# Patient Record
Sex: MALE | Race: BLACK OR AFRICAN AMERICAN | NOT HISPANIC OR LATINO | URBAN - METROPOLITAN AREA
[De-identification: names, ages, dates, MRNs, and addresses within clinical notes are randomized per-mention and may not be internally consistent; named-entity substitution may affect disease eponyms.]

---

## 2017-09-02 ENCOUNTER — EMERGENCY (EMERGENCY)
Facility: HOSPITAL | Age: 27
LOS: 1 days | Discharge: HOME | End: 2017-09-02
Attending: EMERGENCY MEDICINE | Admitting: EMERGENCY MEDICINE
Payer: COMMERCIAL

## 2017-09-02 VITALS
TEMPERATURE: 98 F | RESPIRATION RATE: 18 BRPM | SYSTOLIC BLOOD PRESSURE: 147 MMHG | HEART RATE: 102 BPM | OXYGEN SATURATION: 100 % | DIASTOLIC BLOOD PRESSURE: 88 MMHG

## 2017-09-02 PROCEDURE — 99284 EMERGENCY DEPT VISIT MOD MDM: CPT

## 2017-09-02 RX ORDER — IBUPROFEN 200 MG
600 TABLET ORAL ONCE
Qty: 0 | Refills: 0 | Status: COMPLETED | OUTPATIENT
Start: 2017-09-02 | End: 2017-09-02

## 2017-09-02 RX ADMIN — Medication 600 MILLIGRAM(S): at 12:44
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
## 2017-09-02 NOTE — ED PROVIDER NOTE - PROGRESS NOTE DETAILS
ED Attending Dr. Regan: HR at time of discharge 90; unable to enter into flowsheet because pt discharged in system
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
## 2017-09-02 NOTE — ED PROVIDER NOTE - RESPIRATORY CHEST EXAM
R low rib area where pain is present no TTP at time of exam R low rib area where pain is reported no TTP at time of exam; no step off or crepitus

## 2019-05-15 ENCOUNTER — EMERGENCY (EMERGENCY)
Facility: HOSPITAL | Age: 29
LOS: 1 days | Discharge: HOME | End: 2019-05-15
Attending: EMERGENCY MEDICINE | Admitting: EMERGENCY MEDICINE
Payer: SELF-PAY

## 2019-05-15 VITALS
HEART RATE: 89 BPM | RESPIRATION RATE: 18 BRPM | DIASTOLIC BLOOD PRESSURE: 53 MMHG | SYSTOLIC BLOOD PRESSURE: 117 MMHG | OXYGEN SATURATION: 98 %

## 2019-05-15 VITALS
TEMPERATURE: 98 F | HEART RATE: 93 BPM | SYSTOLIC BLOOD PRESSURE: 90 MMHG | RESPIRATION RATE: 20 BRPM | OXYGEN SATURATION: 97 % | DIASTOLIC BLOOD PRESSURE: 61 MMHG | WEIGHT: 149.91 LBS

## 2019-05-15 DIAGNOSIS — R11.2 NAUSEA WITH VOMITING, UNSPECIFIED: ICD-10-CM

## 2019-05-15 LAB
ALBUMIN SERPL ELPH-MCNC: 4.5 G/DL — SIGNIFICANT CHANGE UP (ref 3.3–5)
ALP SERPL-CCNC: 94 U/L — SIGNIFICANT CHANGE UP (ref 40–120)
ALT FLD-CCNC: 33 U/L DA — SIGNIFICANT CHANGE UP (ref 10–45)
ANION GAP SERPL CALC-SCNC: 10 MMOL/L — SIGNIFICANT CHANGE UP (ref 5–17)
AST SERPL-CCNC: 28 U/L — SIGNIFICANT CHANGE UP (ref 10–40)
BASOPHILS # BLD AUTO: 0.03 K/UL — SIGNIFICANT CHANGE UP (ref 0–0.2)
BASOPHILS NFR BLD AUTO: 0.3 % — SIGNIFICANT CHANGE UP (ref 0–2)
BILIRUB SERPL-MCNC: 0.4 MG/DL — SIGNIFICANT CHANGE UP (ref 0.2–1.2)
BUN SERPL-MCNC: 15 MG/DL — SIGNIFICANT CHANGE UP (ref 7–23)
CALCIUM SERPL-MCNC: 9.8 MG/DL — SIGNIFICANT CHANGE UP (ref 8.4–10.5)
CHLORIDE SERPL-SCNC: 99 MMOL/L — SIGNIFICANT CHANGE UP (ref 96–108)
CO2 SERPL-SCNC: 28 MMOL/L — SIGNIFICANT CHANGE UP (ref 22–31)
CREAT SERPL-MCNC: 0.9 MG/DL — SIGNIFICANT CHANGE UP (ref 0.5–1.3)
EOSINOPHIL # BLD AUTO: 0.04 K/UL — SIGNIFICANT CHANGE UP (ref 0–0.5)
EOSINOPHIL NFR BLD AUTO: 0.5 % — SIGNIFICANT CHANGE UP (ref 0–6)
GLUCOSE SERPL-MCNC: 101 MG/DL — HIGH (ref 70–99)
HCT VFR BLD CALC: 46.6 % — SIGNIFICANT CHANGE UP (ref 39–50)
HGB BLD-MCNC: 14.7 G/DL — SIGNIFICANT CHANGE UP (ref 13–17)
IMM GRANULOCYTES NFR BLD AUTO: 0.2 % — SIGNIFICANT CHANGE UP (ref 0–1.5)
LIDOCAIN IGE QN: 134 U/L — SIGNIFICANT CHANGE UP (ref 73–393)
LYMPHOCYTES # BLD AUTO: 1.83 K/UL — SIGNIFICANT CHANGE UP (ref 1–3.3)
LYMPHOCYTES # BLD AUTO: 21 % — SIGNIFICANT CHANGE UP (ref 13–44)
MCHC RBC-ENTMCNC: 25.9 PG — LOW (ref 27–34)
MCHC RBC-ENTMCNC: 31.5 GM/DL — LOW (ref 32–36)
MCV RBC AUTO: 82.2 FL — SIGNIFICANT CHANGE UP (ref 80–100)
MONOCYTES # BLD AUTO: 0.27 K/UL — SIGNIFICANT CHANGE UP (ref 0–0.9)
MONOCYTES NFR BLD AUTO: 3.1 % — SIGNIFICANT CHANGE UP (ref 2–14)
NEUTROPHILS # BLD AUTO: 6.54 K/UL — SIGNIFICANT CHANGE UP (ref 1.8–7.4)
NEUTROPHILS NFR BLD AUTO: 74.9 % — SIGNIFICANT CHANGE UP (ref 43–77)
NRBC # BLD: 0 /100 WBCS — SIGNIFICANT CHANGE UP (ref 0–0)
PLATELET # BLD AUTO: 255 K/UL — SIGNIFICANT CHANGE UP (ref 150–400)
POTASSIUM SERPL-MCNC: 3.9 MMOL/L — SIGNIFICANT CHANGE UP (ref 3.5–5.3)
POTASSIUM SERPL-SCNC: 3.9 MMOL/L — SIGNIFICANT CHANGE UP (ref 3.5–5.3)
PROT SERPL-MCNC: 9.3 G/DL — HIGH (ref 6–8.3)
RBC # BLD: 5.67 M/UL — SIGNIFICANT CHANGE UP (ref 4.2–5.8)
RBC # FLD: 13.2 % — SIGNIFICANT CHANGE UP (ref 10.3–14.5)
SODIUM SERPL-SCNC: 137 MMOL/L — SIGNIFICANT CHANGE UP (ref 135–145)
WBC # BLD: 8.73 K/UL — SIGNIFICANT CHANGE UP (ref 3.8–10.5)
WBC # FLD AUTO: 8.73 K/UL — SIGNIFICANT CHANGE UP (ref 3.8–10.5)

## 2019-05-15 PROCEDURE — 99284 EMERGENCY DEPT VISIT MOD MDM: CPT | Mod: 25

## 2019-05-15 PROCEDURE — 36415 COLL VENOUS BLD VENIPUNCTURE: CPT

## 2019-05-15 PROCEDURE — 96361 HYDRATE IV INFUSION ADD-ON: CPT

## 2019-05-15 PROCEDURE — 83690 ASSAY OF LIPASE: CPT

## 2019-05-15 PROCEDURE — 80053 COMPREHEN METABOLIC PANEL: CPT

## 2019-05-15 PROCEDURE — 96374 THER/PROPH/DIAG INJ IV PUSH: CPT

## 2019-05-15 PROCEDURE — 85027 COMPLETE CBC AUTOMATED: CPT

## 2019-05-15 PROCEDURE — 99053 MED SERV 10PM-8AM 24 HR FAC: CPT

## 2019-05-15 RX ORDER — ONDANSETRON 8 MG/1
4 TABLET, FILM COATED ORAL ONCE
Refills: 0 | Status: COMPLETED | OUTPATIENT
Start: 2019-05-15 | End: 2019-05-15

## 2019-05-15 RX ORDER — SODIUM CHLORIDE 9 MG/ML
1000 INJECTION INTRAMUSCULAR; INTRAVENOUS; SUBCUTANEOUS ONCE
Refills: 0 | Status: COMPLETED | OUTPATIENT
Start: 2019-05-15 | End: 2019-05-15

## 2019-05-15 RX ADMIN — ONDANSETRON 4 MILLIGRAM(S): 8 TABLET, FILM COATED ORAL at 23:15

## 2019-05-15 RX ADMIN — SODIUM CHLORIDE 1000 MILLILITER(S): 9 INJECTION INTRAMUSCULAR; INTRAVENOUS; SUBCUTANEOUS at 23:55

## 2019-05-15 RX ADMIN — SODIUM CHLORIDE 2000 MILLILITER(S): 9 INJECTION INTRAMUSCULAR; INTRAVENOUS; SUBCUTANEOUS at 23:03

## 2019-05-15 NOTE — ED ADULT TRIAGE NOTE - CHIEF COMPLAINT QUOTE
pt presents to ED with mother. Mother states the pt has been vomiting all day, with associated diarrhea

## 2019-05-15 NOTE — ED ADULT NURSE NOTE - OBJECTIVE STATEMENT
pt came in with complaints of vomiting all day and diarrhea. pt states his last episodes of vomiting was right before coming into ED. pt denies any abdominal pain at this time. Pt denies any chest pain, SOB, blurred vision, headache, dizziness, fever, chills or any other complaint at this time.

## 2019-05-15 NOTE — ED PROVIDER NOTE - OBJECTIVE STATEMENT
Pertinent PMH/PSH/FHx/SHx and Review of Systems contained within:  29 y/o male with no sig pmh BIB mother c/o multiple episodes of vomiting and diarrhea since today morning, No fever, c.o crampy , mod abdominal pain.

## 2019-05-15 NOTE — ED ADULT NURSE NOTE - CAS EDN DISCHARGE INTERVENTIONS
lower abdominal pain for 1 month and back pain for 15 days. Warm IV discontinued, cath removed intact

## 2019-05-15 NOTE — ED ADULT NURSE NOTE - NSIMPLEMENTINTERV_GEN_ALL_ED
Implemented All Universal Safety Interventions:  [place] to call system. Call bell, personal items and telephone within reach. Instruct patient to call for assistance. Room bathroom lighting operational. Non-slip footwear when patient is off stretcher. Physically safe environment: no spills, clutter or unnecessary equipment. Stretcher in lowest position, wheels locked, appropriate side rails in place.

## 2021-03-25 ENCOUNTER — INPATIENT (INPATIENT)
Facility: HOSPITAL | Age: 31
LOS: 2 days | Discharge: HOME | End: 2021-03-28
Attending: ORTHOPAEDIC SURGERY | Admitting: ORTHOPAEDIC SURGERY
Payer: MEDICAID

## 2021-03-25 VITALS
TEMPERATURE: 98 F | DIASTOLIC BLOOD PRESSURE: 78 MMHG | HEART RATE: 100 BPM | RESPIRATION RATE: 16 BRPM | SYSTOLIC BLOOD PRESSURE: 114 MMHG | OXYGEN SATURATION: 100 %

## 2021-03-25 LAB
APTT BLD: 27.7 SEC — SIGNIFICANT CHANGE UP (ref 27–36.3)
HCT VFR BLD CALC: 31.6 % — LOW (ref 39–50)
HGB BLD-MCNC: 10.1 G/DL — LOW (ref 13–17)
IANC: 4.57 K/UL — SIGNIFICANT CHANGE UP (ref 1.5–8.5)
INR BLD: 1.76 RATIO — HIGH (ref 0.88–1.16)
MCHC RBC-ENTMCNC: 26 PG — LOW (ref 27–34)
MCHC RBC-ENTMCNC: 32 GM/DL — SIGNIFICANT CHANGE UP (ref 32–36)
MCV RBC AUTO: 81.2 FL — SIGNIFICANT CHANGE UP (ref 80–100)
PLATELET # BLD AUTO: 209 K/UL — SIGNIFICANT CHANGE UP (ref 150–400)
PROTHROM AB SERPL-ACNC: 19.5 SEC — HIGH (ref 10.6–13.6)
RBC # BLD: 3.89 M/UL — LOW (ref 4.2–5.8)
RBC # FLD: 13.4 % — SIGNIFICANT CHANGE UP (ref 10.3–14.5)
WBC # BLD: 7.32 K/UL — SIGNIFICANT CHANGE UP (ref 3.8–10.5)
WBC # FLD AUTO: 7.32 K/UL — SIGNIFICANT CHANGE UP (ref 3.8–10.5)

## 2021-03-25 PROCEDURE — 99284 EMERGENCY DEPT VISIT MOD MDM: CPT

## 2021-03-25 PROCEDURE — 73552 X-RAY EXAM OF FEMUR 2/>: CPT | Mod: 26,RT

## 2021-03-25 PROCEDURE — 73590 X-RAY EXAM OF LOWER LEG: CPT | Mod: 26,RT

## 2021-03-25 PROCEDURE — 73562 X-RAY EXAM OF KNEE 3: CPT | Mod: 26,RT

## 2021-03-25 PROCEDURE — 73700 CT LOWER EXTREMITY W/O DYE: CPT | Mod: 26,RT

## 2021-03-25 PROCEDURE — 73562 X-RAY EXAM OF KNEE 3: CPT | Mod: 26,RT,77

## 2021-03-25 RX ORDER — DIAZEPAM 5 MG
5 TABLET ORAL ONCE
Refills: 0 | Status: DISCONTINUED | OUTPATIENT
Start: 2021-03-25 | End: 2021-03-25

## 2021-03-25 RX ORDER — HYDROMORPHONE HYDROCHLORIDE 2 MG/ML
0.5 INJECTION INTRAMUSCULAR; INTRAVENOUS; SUBCUTANEOUS ONCE
Refills: 0 | Status: DISCONTINUED | OUTPATIENT
Start: 2021-03-25 | End: 2021-03-25

## 2021-03-25 RX ORDER — KETOROLAC TROMETHAMINE 30 MG/ML
30 SYRINGE (ML) INJECTION ONCE
Refills: 0 | Status: DISCONTINUED | OUTPATIENT
Start: 2021-03-25 | End: 2021-03-25

## 2021-03-25 RX ADMIN — Medication 5 MILLIGRAM(S): at 21:28

## 2021-03-25 RX ADMIN — Medication 30 MILLIGRAM(S): at 19:51

## 2021-03-25 RX ADMIN — HYDROMORPHONE HYDROCHLORIDE 0.5 MILLIGRAM(S): 2 INJECTION INTRAMUSCULAR; INTRAVENOUS; SUBCUTANEOUS at 21:57
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
## 2021-03-25 NOTE — ED PROVIDER NOTE - PROGRESS NOTE DETAILS
JUDE Kenney: "Lateralization the patella. Suspected avulsion fracture at the dorsal patella. Medial subluxation of the distal femur relative to the knee. Soft tissue swelling." Seen by ortho, admission recommended.

## 2021-03-25 NOTE — ED PROVIDER NOTE - PHYSICAL EXAMINATION
Full right lower extremity hard cast in place, not tight, DP/PT pulses 2+, sensation to light touch intact, pt moving toes, cap refill <2secs

## 2021-03-25 NOTE — ED PROVIDER NOTE - OBJECTIVE STATEMENT
30 year old male with no known PMH presents to the ED complaining of pain to right lower extremity for 3 days. Pt states he fell off 3 steps of stairs while in [place], went to the hospital there, had x-rays and was told that he fractured his right knee  and was placed in a hard cast. Pt states the cast is heavy and wants it taken off. Denies weakness, numbness or tingling sensation. Denies any other complaints.

## 2021-03-25 NOTE — ED PROVIDER NOTE - CADM POA PRESS ULCER
Kady Malone   MRN: [id number]    Department:  [facility] Emergency Department   Date of Visit:  10/24/2018           Disclosure     Insurance plans vary and the physician(s) referred by the ER may not be covered by your plan.  Please contact you CARE PHYSICIAN AT ONCE OR RETURN IMMEDIATELY TO THE EMERGENCY DEPARTMENT. If you have been prescribed any medication(s), please fill your prescription right away and begin taking the medication(s) as directed.   If you believe that any of the medications No

## 2021-03-25 NOTE — ED PROVIDER NOTE - CLINICAL SUMMARY MEDICAL DECISION MAKING FREE TEXT BOX
30 year old male with no known PMH presents to the ED complaining of pain to right lower extremity for 3 days. HD stable. On exam, no clinical evidence of compartment syndrome. Impression: Unknown fx location of right lower extremity, in hard cast from [place]. Plan for analgesics and x-rays, reassess.

## 2021-03-25 NOTE — ED ADULT NURSE NOTE - CHIEF COMPLAINT QUOTE
pt arriving from [facility], from [facility] for RLE fracture after fall down stairs 3 days ago. Denies fever, cough, SOB. RLE in cast. +pedal pulse noted. Pt denies PMH

## 2021-03-25 NOTE — ED PROVIDER NOTE - ATTENDING CONTRIBUTION TO CARE
I performed a history and physical exam of the patient and discussed their management with the resident. I reviewed the resident's note and agree with the documented findings and plan of care. I have edited as appropriate. My medical decision making and observations are found above.  NAD, no resp distress I performed a history and physical exam of the patient and discussed their management with the PA. I reviewed the PA's note and agree with the documented findings and plan of care. I have edited as appropriate. My medical decision making and observations are found above.    NAD, no resp distress

## 2021-03-25 NOTE — ED ADULT NURSE REASSESSMENT NOTE - NS ED NURSE REASSESS COMMENT FT1
Ortho at bedside for right patella reduction. Pt medicated prior for pain control. 10cc bright red fluid aspirated from right knee post reduction. Right lower extremity splinted with ace bandage by Ortho MD. Will continue to monitor.

## 2021-03-25 NOTE — ED ADULT NURSE NOTE - NSIMPLEMENTINTERV_GEN_ALL_ED
Implemented All Universal Safety Interventions:  [place] to call system. Call bell, personal items and telephone within reach. Instruct patient to call for assistance. Room bathroom lighting operational. Non-slip footwear when patient is off stretcher. Physically safe environment: no spills, clutter or unnecessary equipment. Stretcher in lowest position, wheels locked, appropriate side rails in place.

## 2021-03-25 NOTE — ED ADULT TRIAGE NOTE - CHIEF COMPLAINT QUOTE
pt arriving from [facility], from [facility] for RLE fracture after fall down stairs 3 days ago. Denies fever, cough, SOB. RLE in cast. +pedal pulse noted. Pt denies PMH

## 2021-03-26 ENCOUNTER — TRANSCRIPTION ENCOUNTER (OUTPATIENT)
Age: 31
End: 2021-03-26

## 2021-03-26 DIAGNOSIS — D64.9 ANEMIA, UNSPECIFIED: ICD-10-CM

## 2021-03-26 DIAGNOSIS — S82.091A OTHER FRACTURE OF RIGHT PATELLA, INITIAL ENCOUNTER FOR CLOSED FRACTURE: ICD-10-CM

## 2021-03-26 PROBLEM — Z78.9 OTHER SPECIFIED HEALTH STATUS: Chronic | Status: ACTIVE | Noted: 2019-05-15

## 2021-03-26 LAB
ALBUMIN SERPL ELPH-MCNC: 3.8 G/DL — SIGNIFICANT CHANGE UP (ref 3.3–5)
ALBUMIN SERPL ELPH-MCNC: 3.9 G/DL — SIGNIFICANT CHANGE UP (ref 3.3–5)
ALP SERPL-CCNC: 59 U/L — SIGNIFICANT CHANGE UP (ref 40–120)
ALP SERPL-CCNC: 60 U/L — SIGNIFICANT CHANGE UP (ref 40–120)
ALT FLD-CCNC: 32 U/L — SIGNIFICANT CHANGE UP (ref 4–41)
ALT FLD-CCNC: 33 U/L — SIGNIFICANT CHANGE UP (ref 4–41)
ANION GAP SERPL CALC-SCNC: 10 MMOL/L — SIGNIFICANT CHANGE UP (ref 7–14)
ANION GAP SERPL CALC-SCNC: 12 MMOL/L — SIGNIFICANT CHANGE UP (ref 7–14)
ANISOCYTOSIS BLD QL: SLIGHT — SIGNIFICANT CHANGE UP
APPEARANCE UR: CLEAR — SIGNIFICANT CHANGE UP
APTT BLD: 30.2 SEC — SIGNIFICANT CHANGE UP (ref 27–36.3)
AST SERPL-CCNC: 65 U/L — HIGH (ref 4–40)
AST SERPL-CCNC: 74 U/L — HIGH (ref 4–40)
BACTERIA # UR AUTO: ABNORMAL
BASOPHILS # BLD AUTO: 0 K/UL — SIGNIFICANT CHANGE UP (ref 0–0.2)
BASOPHILS NFR BLD AUTO: 0 % — SIGNIFICANT CHANGE UP (ref 0–2)
BILIRUB DIRECT SERPL-MCNC: <0.2 MG/DL — SIGNIFICANT CHANGE UP (ref 0–0.2)
BILIRUB DIRECT SERPL-MCNC: <0.2 MG/DL — SIGNIFICANT CHANGE UP (ref 0–0.2)
BILIRUB INDIRECT FLD-MCNC: >0.6 MG/DL — SIGNIFICANT CHANGE UP (ref 0–1)
BILIRUB SERPL-MCNC: 0.7 MG/DL — SIGNIFICANT CHANGE UP (ref 0.2–1.2)
BILIRUB SERPL-MCNC: 0.8 MG/DL — SIGNIFICANT CHANGE UP (ref 0.2–1.2)
BILIRUB UR-MCNC: NEGATIVE — SIGNIFICANT CHANGE UP
BLD GP AB SCN SERPL QL: NEGATIVE — SIGNIFICANT CHANGE UP
BLD GP AB SCN SERPL QL: NEGATIVE — SIGNIFICANT CHANGE UP
BUN SERPL-MCNC: 13 MG/DL — SIGNIFICANT CHANGE UP (ref 7–23)
BUN SERPL-MCNC: 15 MG/DL — SIGNIFICANT CHANGE UP (ref 7–23)
CALCIUM SERPL-MCNC: 9.4 MG/DL — SIGNIFICANT CHANGE UP (ref 8.4–10.5)
CALCIUM SERPL-MCNC: 9.5 MG/DL — SIGNIFICANT CHANGE UP (ref 8.4–10.5)
CHLORIDE SERPL-SCNC: 102 MMOL/L — SIGNIFICANT CHANGE UP (ref 98–107)
CHLORIDE SERPL-SCNC: 103 MMOL/L — SIGNIFICANT CHANGE UP (ref 98–107)
CO2 SERPL-SCNC: 24 MMOL/L — SIGNIFICANT CHANGE UP (ref 22–31)
CO2 SERPL-SCNC: 24 MMOL/L — SIGNIFICANT CHANGE UP (ref 22–31)
COLOR SPEC: YELLOW — SIGNIFICANT CHANGE UP
CREAT SERPL-MCNC: 0.7 MG/DL — SIGNIFICANT CHANGE UP (ref 0.5–1.3)
CREAT SERPL-MCNC: 0.79 MG/DL — SIGNIFICANT CHANGE UP (ref 0.5–1.3)
DIFF PNL FLD: ABNORMAL
EOSINOPHIL # BLD AUTO: 0.32 K/UL — SIGNIFICANT CHANGE UP (ref 0–0.5)
EOSINOPHIL NFR BLD AUTO: 4.4 % — SIGNIFICANT CHANGE UP (ref 0–6)
EPI CELLS # UR: 2 /HPF — SIGNIFICANT CHANGE UP (ref 0–5)
GIANT PLATELETS BLD QL SMEAR: PRESENT — SIGNIFICANT CHANGE UP
GLUCOSE SERPL-MCNC: 76 MG/DL — SIGNIFICANT CHANGE UP (ref 70–99)
GLUCOSE SERPL-MCNC: 98 MG/DL — SIGNIFICANT CHANGE UP (ref 70–99)
GLUCOSE UR QL: NEGATIVE — SIGNIFICANT CHANGE UP
HCT VFR BLD CALC: 34.3 % — LOW (ref 39–50)
HGB BLD-MCNC: 11.1 G/DL — LOW (ref 13–17)
HYALINE CASTS # UR AUTO: 1 /LPF — SIGNIFICANT CHANGE UP (ref 0–7)
INR BLD: 1.47 RATIO — HIGH (ref 0.88–1.16)
KETONES UR-MCNC: ABNORMAL
LEUKOCYTE ESTERASE UR-ACNC: ABNORMAL
LYMPHOCYTES # BLD AUTO: 1.36 K/UL — SIGNIFICANT CHANGE UP (ref 1–3.3)
LYMPHOCYTES # BLD AUTO: 18.6 % — SIGNIFICANT CHANGE UP (ref 13–44)
MCHC RBC-ENTMCNC: 26.7 PG — LOW (ref 27–34)
MCHC RBC-ENTMCNC: 32.4 GM/DL — SIGNIFICANT CHANGE UP (ref 32–36)
MCV RBC AUTO: 82.7 FL — SIGNIFICANT CHANGE UP (ref 80–100)
MICROCYTES BLD QL: SLIGHT — SIGNIFICANT CHANGE UP
MONOCYTES # BLD AUTO: 0.45 K/UL — SIGNIFICANT CHANGE UP (ref 0–0.9)
MONOCYTES NFR BLD AUTO: 6.2 % — SIGNIFICANT CHANGE UP (ref 2–14)
NEUTROPHILS # BLD AUTO: 5.05 K/UL — SIGNIFICANT CHANGE UP (ref 1.8–7.4)
NEUTROPHILS NFR BLD AUTO: 69 % — SIGNIFICANT CHANGE UP (ref 43–77)
NITRITE UR-MCNC: NEGATIVE — SIGNIFICANT CHANGE UP
NRBC # BLD: 0 /100 WBCS — SIGNIFICANT CHANGE UP
NRBC # FLD: 0 K/UL — SIGNIFICANT CHANGE UP
OVALOCYTES BLD QL SMEAR: SLIGHT — SIGNIFICANT CHANGE UP
PH UR: 6 — SIGNIFICANT CHANGE UP (ref 5–8)
PLAT MORPH BLD: NORMAL — SIGNIFICANT CHANGE UP
PLATELET # BLD AUTO: 212 K/UL — SIGNIFICANT CHANGE UP (ref 150–400)
PLATELET COUNT - ESTIMATE: NORMAL — SIGNIFICANT CHANGE UP
POIKILOCYTOSIS BLD QL AUTO: SLIGHT — SIGNIFICANT CHANGE UP
POTASSIUM SERPL-MCNC: 3.8 MMOL/L — SIGNIFICANT CHANGE UP (ref 3.5–5.3)
POTASSIUM SERPL-MCNC: 4 MMOL/L — SIGNIFICANT CHANGE UP (ref 3.5–5.3)
POTASSIUM SERPL-SCNC: 3.8 MMOL/L — SIGNIFICANT CHANGE UP (ref 3.5–5.3)
POTASSIUM SERPL-SCNC: 4 MMOL/L — SIGNIFICANT CHANGE UP (ref 3.5–5.3)
PROT SERPL-MCNC: 7 G/DL — SIGNIFICANT CHANGE UP (ref 6–8.3)
PROT SERPL-MCNC: 7.1 G/DL — SIGNIFICANT CHANGE UP (ref 6–8.3)
PROT UR-MCNC: ABNORMAL
PROTHROM AB SERPL-ACNC: 16.6 SEC — HIGH (ref 10.6–13.6)
RBC # BLD: 4.15 M/UL — LOW (ref 4.2–5.8)
RBC # FLD: 13.4 % — SIGNIFICANT CHANGE UP (ref 10.3–14.5)
RBC BLD AUTO: NORMAL — SIGNIFICANT CHANGE UP
RBC CASTS # UR COMP ASSIST: 16 /HPF — HIGH (ref 0–4)
RH IG SCN BLD-IMP: POSITIVE — SIGNIFICANT CHANGE UP
RH IG SCN BLD-IMP: POSITIVE — SIGNIFICANT CHANGE UP
SARS-COV-2 RNA SPEC QL NAA+PROBE: SIGNIFICANT CHANGE UP
SODIUM SERPL-SCNC: 137 MMOL/L — SIGNIFICANT CHANGE UP (ref 135–145)
SODIUM SERPL-SCNC: 138 MMOL/L — SIGNIFICANT CHANGE UP (ref 135–145)
SP GR SPEC: 1.04 — HIGH (ref 1.01–1.02)
UROBILINOGEN FLD QL: SIGNIFICANT CHANGE UP
VARIANT LYMPHS # BLD: 1.8 % — SIGNIFICANT CHANGE UP (ref 0–6)
WBC # BLD: 6.33 K/UL — SIGNIFICANT CHANGE UP (ref 3.8–10.5)
WBC # FLD AUTO: 6.33 K/UL — SIGNIFICANT CHANGE UP (ref 3.8–10.5)
WBC UR QL: 12 /HPF — HIGH (ref 0–5)

## 2021-03-26 PROCEDURE — 93970 EXTREMITY STUDY: CPT | Mod: 26

## 2021-03-26 PROCEDURE — 71045 X-RAY EXAM CHEST 1 VIEW: CPT | Mod: 26

## 2021-03-26 PROCEDURE — 99223 1ST HOSP IP/OBS HIGH 75: CPT

## 2021-03-26 RX ORDER — SODIUM CHLORIDE 9 MG/ML
1000 INJECTION INTRAMUSCULAR; INTRAVENOUS; SUBCUTANEOUS
Refills: 0 | Status: DISCONTINUED | OUTPATIENT
Start: 2021-03-26 | End: 2021-03-27

## 2021-03-26 RX ORDER — HYDROMORPHONE HYDROCHLORIDE 2 MG/ML
0.5 INJECTION INTRAMUSCULAR; INTRAVENOUS; SUBCUTANEOUS EVERY 6 HOURS
Refills: 0 | Status: DISCONTINUED | OUTPATIENT
Start: 2021-03-26 | End: 2021-03-28

## 2021-03-26 RX ORDER — PANTOPRAZOLE SODIUM 20 MG/1
40 TABLET, DELAYED RELEASE ORAL
Refills: 0 | Status: DISCONTINUED | OUTPATIENT
Start: 2021-03-26 | End: 2021-03-28

## 2021-03-26 RX ORDER — CHLORHEXIDINE GLUCONATE 213 G/1000ML
1 SOLUTION TOPICAL ONCE
Refills: 0 | Status: COMPLETED | OUTPATIENT
Start: 2021-03-27 | End: 2021-03-27

## 2021-03-26 RX ORDER — MAGNESIUM HYDROXIDE 400 MG/1
30 TABLET, CHEWABLE ORAL DAILY
Refills: 0 | Status: DISCONTINUED | OUTPATIENT
Start: 2021-03-26 | End: 2021-03-28

## 2021-03-26 RX ORDER — OXYCODONE HYDROCHLORIDE 5 MG/1
2.5 TABLET ORAL EVERY 4 HOURS
Refills: 0 | Status: DISCONTINUED | OUTPATIENT
Start: 2021-03-26 | End: 2021-03-28

## 2021-03-26 RX ORDER — SENNA PLUS 8.6 MG/1
2 TABLET ORAL AT BEDTIME
Refills: 0 | Status: DISCONTINUED | OUTPATIENT
Start: 2021-03-26 | End: 2021-03-28

## 2021-03-26 RX ORDER — ENOXAPARIN SODIUM 100 MG/ML
40 INJECTION SUBCUTANEOUS ONCE
Refills: 0 | Status: COMPLETED | OUTPATIENT
Start: 2021-03-26 | End: 2021-03-26

## 2021-03-26 RX ORDER — OXYCODONE HYDROCHLORIDE 5 MG/1
5 TABLET ORAL EVERY 4 HOURS
Refills: 0 | Status: DISCONTINUED | OUTPATIENT
Start: 2021-03-26 | End: 2021-03-28

## 2021-03-26 RX ORDER — OXYCODONE HYDROCHLORIDE 5 MG/1
5 TABLET ORAL EVERY 4 HOURS
Refills: 0 | Status: DISCONTINUED | OUTPATIENT
Start: 2021-03-26 | End: 2021-03-26

## 2021-03-26 RX ORDER — ONDANSETRON 8 MG/1
4 TABLET, FILM COATED ORAL EVERY 6 HOURS
Refills: 0 | Status: DISCONTINUED | OUTPATIENT
Start: 2021-03-26 | End: 2021-03-28

## 2021-03-26 RX ORDER — POLYETHYLENE GLYCOL 3350 17 G/17G
17 POWDER, FOR SOLUTION ORAL ONCE
Refills: 0 | Status: DISCONTINUED | OUTPATIENT
Start: 2021-03-26 | End: 2021-03-28

## 2021-03-26 RX ORDER — HYDROMORPHONE HYDROCHLORIDE 2 MG/ML
0.5 INJECTION INTRAMUSCULAR; INTRAVENOUS; SUBCUTANEOUS EVERY 6 HOURS
Refills: 0 | Status: DISCONTINUED | OUTPATIENT
Start: 2021-03-26 | End: 2021-03-26

## 2021-03-26 RX ORDER — OXYCODONE HYDROCHLORIDE 5 MG/1
2.5 TABLET ORAL EVERY 4 HOURS
Refills: 0 | Status: DISCONTINUED | OUTPATIENT
Start: 2021-03-26 | End: 2021-03-26

## 2021-03-26 RX ORDER — ACETAMINOPHEN 500 MG
975 TABLET ORAL EVERY 8 HOURS
Refills: 0 | Status: DISCONTINUED | OUTPATIENT
Start: 2021-03-26 | End: 2021-03-26

## 2021-03-26 RX ORDER — OXYCODONE HYDROCHLORIDE 5 MG/1
10 TABLET ORAL EVERY 4 HOURS
Refills: 0 | Status: DISCONTINUED | OUTPATIENT
Start: 2021-03-26 | End: 2021-03-28

## 2021-03-26 RX ORDER — INFLUENZA VIRUS VACCINE 15; 15; 15; 15 UG/.5ML; UG/.5ML; UG/.5ML; UG/.5ML
0.5 SUSPENSION INTRAMUSCULAR ONCE
Refills: 0 | Status: COMPLETED | OUTPATIENT
Start: 2021-03-26 | End: 2021-03-26

## 2021-03-26 RX ORDER — SODIUM CHLORIDE 9 MG/ML
1000 INJECTION INTRAMUSCULAR; INTRAVENOUS; SUBCUTANEOUS
Refills: 0 | Status: DISCONTINUED | OUTPATIENT
Start: 2021-03-26 | End: 2021-03-26

## 2021-03-26 RX ORDER — POVIDONE-IODINE 5 %
1 AEROSOL (ML) TOPICAL ONCE
Refills: 0 | Status: COMPLETED | OUTPATIENT
Start: 2021-03-27 | End: 2021-03-27

## 2021-03-26 RX ADMIN — SODIUM CHLORIDE 125 MILLILITER(S): 9 INJECTION INTRAMUSCULAR; INTRAVENOUS; SUBCUTANEOUS at 07:13

## 2021-03-26 RX ADMIN — OXYCODONE HYDROCHLORIDE 10 MILLIGRAM(S): 5 TABLET ORAL at 13:02

## 2021-03-26 RX ADMIN — SODIUM CHLORIDE 125 MILLILITER(S): 9 INJECTION INTRAMUSCULAR; INTRAVENOUS; SUBCUTANEOUS at 05:00

## 2021-03-26 RX ADMIN — OXYCODONE HYDROCHLORIDE 10 MILLIGRAM(S): 5 TABLET ORAL at 13:54

## 2021-03-26 RX ADMIN — ENOXAPARIN SODIUM 40 MILLIGRAM(S): 100 INJECTION SUBCUTANEOUS at 13:02

## 2021-03-26 RX ADMIN — SODIUM CHLORIDE 125 MILLILITER(S): 9 INJECTION INTRAMUSCULAR; INTRAVENOUS; SUBCUTANEOUS at 05:49

## 2021-03-26 NOTE — H&P ADULT - ATTENDING COMMENTS
Pt with complex, comminuted distal femur fx involving postero-lateral femoral condyle. Patella subluxation likely due to joint incongruity.  Requires ORIF.  Pt made aware of likely long term effects of this fracture (even with anatomic reduction and fixation) including knee stiffness,  loss of ROM, and inability to return to sports/athletics.  general R/B/A also discussed

## 2021-03-26 NOTE — CONSULT NOTE ADULT - SUBJECTIVE AND OBJECTIVE BOX
HPI:    Pt is a 31 y/o male with no significant pmhx who presents to the ER with RLE pain for 2-3 days. He was in [facility] 2 days ago and fell off 3 steps onto his R side and was unable to stand up nor ambulate. He denies any loss of consciousness or head trauma. He went to a hospital in [facility] and was told he had a patella dislocation and put in a cylinder cast. He landed at [facility] and came to the ED. He was prescribed combination diclofenac/paracetamol which he has been taking TID as well as ibuprofen 400mg which he has been taking bid. He denies any hx of cardiac disease, cva, DM, CKD nor any bleeding disorder. No bruising noted on his body. No other medications or blood thinners recently. At baseline pt is able to ambulate and jog with no chest pain nor shortness of breath. He is able to take care of all his ADLs.    PAST MEDICAL & SURGICAL HISTORY:  No pertinent past medical history    No pertinent past medical history    Pt with hx of L knee surgery        Review of Systems:   CONSTITUTIONAL: No fever, weight loss, or fatigue  EYES: No eye pain, visual disturbances, or discharge  ENMT:  No difficulty hearing, tinnitus, vertigo; No sinus or throat pain  NECK: No pain or stiffness  RESPIRATORY: No cough, wheezing, chills or hemoptysis; No shortness of breath  CARDIOVASCULAR: No chest pain, palpitations, dizziness, or leg swelling  GASTROINTESTINAL: No abdominal or epigastric pain. No nausea, vomiting, or hematemesis; No diarrhea or constipation. No melena or hematochezia.  GENITOURINARY: No dysuria, frequency, hematuria, or incontinence  NEUROLOGICAL: +RLE weakness due to pain, otherwise no headaches, memory loss, numbness, or tremors  SKIN: No itching, burning, rashes, or lesions   MUSCULOSKELETAL: +RLE pain  PSYCHIATRIC: No depression, anxiety, mood swings, or difficulty sleeping  HEME/LYMPH: No easy bruising, or bleeding gums      Allergies    No Known Allergies    Intolerances        Social History: no smoking, alcohol nor drug use    FAMILY HISTORY: no family hx of bleeding disorders nor clots      MEDICATIONS  (STANDING):  acetaminophen   Tablet .. 975 milliGRAM(s) Oral every 8 hours  senna 2 Tablet(s) Oral at bedtime  sodium chloride 0.9%. 1000 milliLiter(s) (125 mL/Hr) IV Continuous <Continuous>    MEDICATIONS  (PRN):  HYDROmorphone  Injectable 0.5 milliGRAM(s) IV Push every 6 hours PRN Severe Pain (7 - 10)  magnesium hydroxide Suspension 30 milliLiter(s) Oral daily PRN Constipation  oxyCODONE    IR 2.5 milliGRAM(s) Oral every 4 hours PRN Moderate Pain (4 - 6)  oxyCODONE    IR 5 milliGRAM(s) Oral every 4 hours PRN Severe Pain (7 - 10)        CAPILLARY BLOOD GLUCOSE        I&O's Summary      PHYSICAL EXAM:  GENERAL: NAD, well-developed  HEAD:  Atraumatic, Normocephalic  EYES: EOMI, PERRLA, conjunctiva and sclera clear  NECK: Supple, No JVD  CHEST/LUNG: Clear to auscultation bilaterally; No wheeze  HEART: Regular rate and rhythm; No murmurs, rubs, or gallops  ABDOMEN: Soft, Nontender, Nondistended; Bowel sounds present  EXTREMITIES: RLE wrapped in ACE wrap, no pitting edema b/l  PSYCH: AAOx3  NEUROLOGY: non-focal, RLE ROM limited due to pain  SKIN: No rashes or lesions    LABS:                        10.1   7.32  )-----------( 209      ( 25 Mar 2021 23:37 )             31.6     03-25    138  |  102  |  13  ----------------------------<  98  3.8   |  24  |  0.70    Ca    9.4      25 Mar 2021 23:37    TPro  7.1  /  Alb  3.8  /  TBili  0.7  /  DBili  x   /  AST  74<H>  /  ALT  32  /  AlkPhos  60  03-25    PT/INR - ( 25 Mar 2021 23:37 )   PT: 19.5 sec;   INR: 1.76 ratio         PTT - ( 25 Mar 2021 23:37 )  PTT:27.7 sec    EKG: Normal sinus rhythm, early repol lead V2    xray fibula: IMPRESSION:    Status post casting of the right lower extremity limiting evaluation.  Lateralization the patella. Suspected avulsion fracture at the dorsal patella.  Medial subluxation of the distal femur relative to the knee. Soft tissue swelling.  CT can be performed for more detailed evaluation.      RADIOLOGY & ADDITIONAL TESTS:    Imaging Personally Reviewed:    Consultant(s) Notes Reviewed:      Care Discussed with Consultants/Other Providers:   HPI:    Pt is a 29 y/o male with no significant pmhx who presents to the ER with RLE pain for 2-3 days. He was in [facility] 2 days ago and fell off 3 steps onto his R side and was unable to stand up nor ambulate. He denies any loss of consciousness or head trauma. He went to a hospital in [facility] and was told he had a patella dislocation and put in a cylinder cast. He landed at [facility] and came to the ED. He was prescribed combination diclofenac/paracetamol which he has been taking TID as well as ibuprofen 400mg which he has been taking bid. He denies any hx of cardiac disease, cva, DM, CKD nor any bleeding disorder. No bruising noted on his body. No other medications or blood thinners recently. No other fever, chills, cough, chest pain, shortness of breath, abd pain, LE edema, diarrhea. At baseline pt is able to ambulate and jog with no chest pain nor shortness of breath. He is able to take care of all his ADLs.    PAST MEDICAL & SURGICAL HISTORY:  No pertinent past medical history    No pertinent past medical history    Pt with hx of L knee surgery        Review of Systems:   CONSTITUTIONAL: No fever, weight loss, or fatigue  EYES: No eye pain, visual disturbances, or discharge  ENMT:  No difficulty hearing, tinnitus, vertigo; No sinus or throat pain  NECK: No pain or stiffness  RESPIRATORY: No cough, wheezing, chills or hemoptysis; No shortness of breath  CARDIOVASCULAR: No chest pain, palpitations, dizziness, or leg swelling  GASTROINTESTINAL: No abdominal or epigastric pain. No nausea, vomiting, or hematemesis; No diarrhea or constipation. No melena or hematochezia.  GENITOURINARY: No dysuria, frequency, hematuria, or incontinence  NEUROLOGICAL: +RLE weakness due to pain, otherwise no headaches, memory loss, numbness, or tremors  SKIN: No itching, burning, rashes, or lesions   MUSCULOSKELETAL: +RLE pain  PSYCHIATRIC: No depression, anxiety, mood swings, or difficulty sleeping  HEME/LYMPH: No easy bruising, or bleeding gums      Allergies    No Known Allergies    Intolerances        Social History: no smoking, alcohol nor drug use    FAMILY HISTORY: no family hx of bleeding disorders nor clots      MEDICATIONS  (STANDING):  acetaminophen   Tablet .. 975 milliGRAM(s) Oral every 8 hours  senna 2 Tablet(s) Oral at bedtime  sodium chloride 0.9%. 1000 milliLiter(s) (125 mL/Hr) IV Continuous <Continuous>    MEDICATIONS  (PRN):  HYDROmorphone  Injectable 0.5 milliGRAM(s) IV Push every 6 hours PRN Severe Pain (7 - 10)  magnesium hydroxide Suspension 30 milliLiter(s) Oral daily PRN Constipation  oxyCODONE    IR 2.5 milliGRAM(s) Oral every 4 hours PRN Moderate Pain (4 - 6)  oxyCODONE    IR 5 milliGRAM(s) Oral every 4 hours PRN Severe Pain (7 - 10)        CAPILLARY BLOOD GLUCOSE        I&O's Summary      PHYSICAL EXAM:  GENERAL: NAD, well-developed  HEAD:  Atraumatic, Normocephalic  EYES: EOMI, PERRLA, conjunctiva and sclera clear  NECK: Supple, No JVD  CHEST/LUNG: Clear to auscultation bilaterally; No wheeze  HEART: Regular rate and rhythm; No murmurs, rubs, or gallops  ABDOMEN: Soft, Nontender, Nondistended; Bowel sounds present  EXTREMITIES: RLE wrapped in ACE wrap, no pitting edema b/l  PSYCH: AAOx3  NEUROLOGY: non-focal, RLE ROM limited due to pain  SKIN: No rashes or lesions    LABS:                        10.1   7.32  )-----------( 209      ( 25 Mar 2021 23:37 )             31.6     03-25    138  |  102  |  13  ----------------------------<  98  3.8   |  24  |  0.70    Ca    9.4      25 Mar 2021 23:37    TPro  7.1  /  Alb  3.8  /  TBili  0.7  /  DBili  x   /  AST  74<H>  /  ALT  32  /  AlkPhos  60  03-25    PT/INR - ( 25 Mar 2021 23:37 )   PT: 19.5 sec;   INR: 1.76 ratio         PTT - ( 25 Mar 2021 23:37 )  PTT:27.7 sec    EKG: Normal sinus rhythm, early repol lead V2    xray fibula: IMPRESSION:    Status post casting of the right lower extremity limiting evaluation.  Lateralization the patella. Suspected avulsion fracture at the dorsal patella.  Medial subluxation of the distal femur relative to the knee. Soft tissue swelling.  CT can be performed for more detailed evaluation.      RADIOLOGY & ADDITIONAL TESTS:    Imaging Personally Reviewed:    Consultant(s) Notes Reviewed:      Care Discussed with Consultants/Other Providers:

## 2021-03-26 NOTE — CONSULT NOTE ADULT - ASSESSMENT
29 y/o male with no significant pmhx who presents to the ER with RLE pain for 2-3 days after a mechanical fall, found in the ER to have R patella lateral dislocation and distal femoral condyle fx
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
## 2021-03-26 NOTE — CONSULT NOTE ADULT - PROBLEM SELECTOR RECOMMENDATION 2
Pt with unspecified anemia to 10 from baseline 14.7 two years ago. Pt denies any bleeding or bruising. Pt also with elevated INR to 1.76 with normal PTT, normal plt, normal albumin/LFTs. No hx of easy bleeding/bruising and no blood clots  -no recent antibiotics or blood thinners, just taking meds prescribed by [facility] combo diclofenac/paracetamol and ibuprofen  -Doubt DIC as plt, ptt normal and not hemolyzing as bili wnl. would repeat INR and obtain iron studies including tibc, ferritin, retic count, transferrin, b12, folate, fibrinogen  -defer to ortho regarding INR level prior to surgery. would consult heme in AM if INR still elevated or worsens. Pt with unspecified anemia to 10 from baseline 14.7 two years ago. Pt denies any bleeding or bruising. Pt also with elevated INR to 1.76 with normal PTT, normal plt, normal albumin/LFTs. No hx of easy bleeding/bruising and no blood clots  -no recent antibiotics or blood thinners, just taking meds prescribed by [facility] combo diclofenac/paracetamol and ibuprofen  -Doubt DIC as plt, ptt normal and not hemolyzing as bili wnl. would repeat labs including cbc, lfts, INR and obtain iron studies including tibc, ferritin, retic count, transferrin, b12, folate, fibrinogen, ldh, haptoglobin and hold off surgery if labs worsening  -defer to ortho regarding INR level prior to surgery. would consult heme in AM if INR still elevated or worsens. Pt with unspecified anemia to 10 from baseline 14.7 two years ago. Pt denies any bleeding or bruising. Pt also with elevated INR to 1.76 with normal PTT, normal plt, normal albumin/LFTs. No hx of easy bleeding/bruising and no blood clots  -no recent antibiotics or blood thinners, just taking meds prescribed by [facility] combo diclofenac/paracetamol and ibuprofen  -Doubt DIC as plt, ptt normal and not hemolyzing as bili wnl. would repeat labs including cbc, lfts, INR and obtain iron studies including tibc, ferritin, retic count, transferrin, b12, folate, fibrinogen, ldh, haptoglobin   -defer to ortho regarding INR level prior to surgery. would consult heme in AM if INR/labs still elevated or worsens. Pt with unspecified anemia to 10 from baseline 14.7 two years ago. Pt denies any bleeding or bruising. Pt also with elevated INR to 1.76 with normal PTT, normal plt, normal albumin/bili. No hx of easy bleeding/bruising and no blood clots  -no recent antibiotics or blood thinners, just taking meds prescribed by [facility] combo diclofenac/paracetamol and ibuprofen  -Doubt DIC as plt, ptt normal and not hemolyzing as bili wnl. would repeat labs including cbc, lfts, INR and obtain iron studies including tibc, ferritin, retic count, transferrin, b12, folate, fibrinogen, ldh, haptoglobin   -defer to ortho regarding INR level prior to surgery. would consult heme in AM if INR/labs still elevated or worsens.  -Pt denies any alcohol use, however AST slightly elevated and with elevated INR would stop tylenol for now. Repeat LFTs as above

## 2021-03-26 NOTE — H&P ADULT - NSHPRISKHIVSCREEN_GEN_ALL_CORE
Peripheral Block    Performed by: Tristan Hutchison MD  Authorized by: Tristan Hutchison MD       Block Type:     Assessment:    Injection Assessment:           Interscalene Nerve Block    right Interscalene nerve block:     Risks, benefits, alternatives explained at length and patient agrees to proceed. Time out performed and site for block/surgery identified. Standard monitors applied, 3 L NC O2, and sedation given as recorded by RN so as to achieve patient comfort and anxiolysis, but maintain meaningful verbal contact. Sterile prep followed by 1-2 mL local at insertion site. A 40 mm, 22G insulated stimiplex needle was inserted with ultrasound guidance. 15 ml 0.5% ropivacaine + 15 ml 2% lidocaine with 1:200k epi was injected without resistance and with gentle aspiration every 3-5 ml.      5 ml 0.5% ropivacaine + 5 ml lidocaine with 1:200k epi was injected at right axilla for field block. No pain, paresthesias, or blood were noted. No complications. VSS throughout. Per the request of surgeon for post-op pain. Unable to store ultrasound image due to machine error. Unable to offer due to clinical condition

## 2021-03-26 NOTE — CONSULT NOTE ADULT - PROBLEM SELECTOR RECOMMENDATION 9
2/2 mechanical fall. Admitted to ortho service for surgical intervention  -Pt was able to perform all ADLs prior to fall. RCRI of 0, class 1 risk  -EKG normal sinus rhythm, vitals stable. No further cardiac workup prior to surgery  -pain control per orthopaedics 2/2 mechanical fall. Admitted to ortho service for surgical intervention  -Pt was able to perform all ADLs prior to fall. RCRI of 0, class 1 risk  -EKG normal sinus rhythm, vitals stable. No further cardiac workup prior to surgery  -pain control per orthopaedics  -Would repeat UA as results unclear - concentrated with ketones, small leuks only 12 wbc, repeat after fluids. f/u cultures. Pt afebrile, asymptomatic, with no leukocytosis

## 2021-03-27 ENCOUNTER — RESULT REVIEW (OUTPATIENT)
Age: 31
End: 2021-03-27

## 2021-03-27 LAB
ANION GAP SERPL CALC-SCNC: 11 MMOL/L — SIGNIFICANT CHANGE UP (ref 7–14)
ANION GAP SERPL CALC-SCNC: 7 MMOL/L — SIGNIFICANT CHANGE UP (ref 7–14)
APTT BLD: 29.3 SEC — SIGNIFICANT CHANGE UP (ref 27–36.3)
BUN SERPL-MCNC: 11 MG/DL — SIGNIFICANT CHANGE UP (ref 7–23)
BUN SERPL-MCNC: 14 MG/DL — SIGNIFICANT CHANGE UP (ref 7–23)
CALCIUM SERPL-MCNC: 8.8 MG/DL — SIGNIFICANT CHANGE UP (ref 8.4–10.5)
CALCIUM SERPL-MCNC: 8.9 MG/DL — SIGNIFICANT CHANGE UP (ref 8.4–10.5)
CHLORIDE SERPL-SCNC: 103 MMOL/L — SIGNIFICANT CHANGE UP (ref 98–107)
CHLORIDE SERPL-SCNC: 105 MMOL/L — SIGNIFICANT CHANGE UP (ref 98–107)
CO2 SERPL-SCNC: 23 MMOL/L — SIGNIFICANT CHANGE UP (ref 22–31)
CO2 SERPL-SCNC: 26 MMOL/L — SIGNIFICANT CHANGE UP (ref 22–31)
CREAT SERPL-MCNC: 0.64 MG/DL — SIGNIFICANT CHANGE UP (ref 0.5–1.3)
CREAT SERPL-MCNC: 0.67 MG/DL — SIGNIFICANT CHANGE UP (ref 0.5–1.3)
GLUCOSE SERPL-MCNC: 133 MG/DL — HIGH (ref 70–99)
GLUCOSE SERPL-MCNC: 88 MG/DL — SIGNIFICANT CHANGE UP (ref 70–99)
HCT VFR BLD CALC: 31 % — LOW (ref 39–50)
HCT VFR BLD CALC: 32.3 % — LOW (ref 39–50)
HGB BLD-MCNC: 10 G/DL — LOW (ref 13–17)
HGB BLD-MCNC: 10.1 G/DL — LOW (ref 13–17)
INR BLD: 1.38 RATIO — HIGH (ref 0.88–1.16)
MCHC RBC-ENTMCNC: 26 PG — LOW (ref 27–34)
MCHC RBC-ENTMCNC: 26.6 PG — LOW (ref 27–34)
MCHC RBC-ENTMCNC: 31.3 GM/DL — LOW (ref 32–36)
MCHC RBC-ENTMCNC: 32.3 GM/DL — SIGNIFICANT CHANGE UP (ref 32–36)
MCV RBC AUTO: 82.4 FL — SIGNIFICANT CHANGE UP (ref 80–100)
MCV RBC AUTO: 83 FL — SIGNIFICANT CHANGE UP (ref 80–100)
NRBC # BLD: 0 /100 WBCS — SIGNIFICANT CHANGE UP
NRBC # BLD: 0 /100 WBCS — SIGNIFICANT CHANGE UP
NRBC # FLD: 0 K/UL — SIGNIFICANT CHANGE UP
NRBC # FLD: 0 K/UL — SIGNIFICANT CHANGE UP
PLATELET # BLD AUTO: 217 K/UL — SIGNIFICANT CHANGE UP (ref 150–400)
PLATELET # BLD AUTO: 230 K/UL — SIGNIFICANT CHANGE UP (ref 150–400)
POTASSIUM SERPL-MCNC: 4 MMOL/L — SIGNIFICANT CHANGE UP (ref 3.5–5.3)
POTASSIUM SERPL-MCNC: 4.2 MMOL/L — SIGNIFICANT CHANGE UP (ref 3.5–5.3)
POTASSIUM SERPL-SCNC: 4 MMOL/L — SIGNIFICANT CHANGE UP (ref 3.5–5.3)
POTASSIUM SERPL-SCNC: 4.2 MMOL/L — SIGNIFICANT CHANGE UP (ref 3.5–5.3)
PROTHROM AB SERPL-ACNC: 15.6 SEC — HIGH (ref 10.6–13.6)
RBC # BLD: 3.76 M/UL — LOW (ref 4.2–5.8)
RBC # BLD: 3.89 M/UL — LOW (ref 4.2–5.8)
RBC # FLD: 13.2 % — SIGNIFICANT CHANGE UP (ref 10.3–14.5)
RBC # FLD: 13.4 % — SIGNIFICANT CHANGE UP (ref 10.3–14.5)
SODIUM SERPL-SCNC: 137 MMOL/L — SIGNIFICANT CHANGE UP (ref 135–145)
SODIUM SERPL-SCNC: 138 MMOL/L — SIGNIFICANT CHANGE UP (ref 135–145)
WBC # BLD: 10.77 K/UL — HIGH (ref 3.8–10.5)
WBC # BLD: 5.48 K/UL — SIGNIFICANT CHANGE UP (ref 3.8–10.5)
WBC # FLD AUTO: 10.77 K/UL — HIGH (ref 3.8–10.5)
WBC # FLD AUTO: 5.48 K/UL — SIGNIFICANT CHANGE UP (ref 3.8–10.5)

## 2021-03-27 PROCEDURE — 88311 DECALCIFY TISSUE: CPT | Mod: 26

## 2021-03-27 PROCEDURE — 88304 TISSUE EXAM BY PATHOLOGIST: CPT | Mod: 26

## 2021-03-27 PROCEDURE — 99232 SBSQ HOSP IP/OBS MODERATE 35: CPT

## 2021-03-27 PROCEDURE — 27513 TREATMENT OF THIGH FRACTURE: CPT | Mod: RT

## 2021-03-27 RX ORDER — OXYCODONE HYDROCHLORIDE 5 MG/1
5 TABLET ORAL ONCE
Refills: 0 | Status: DISCONTINUED | OUTPATIENT
Start: 2021-03-27 | End: 2021-03-27

## 2021-03-27 RX ORDER — FENTANYL CITRATE 50 UG/ML
50 INJECTION INTRAVENOUS
Refills: 0 | Status: DISCONTINUED | OUTPATIENT
Start: 2021-03-27 | End: 2021-03-27

## 2021-03-27 RX ORDER — ENOXAPARIN SODIUM 100 MG/ML
40 INJECTION SUBCUTANEOUS DAILY
Refills: 0 | Status: DISCONTINUED | OUTPATIENT
Start: 2021-03-28 | End: 2021-03-28

## 2021-03-27 RX ORDER — HYDROMORPHONE HYDROCHLORIDE 2 MG/ML
0.5 INJECTION INTRAMUSCULAR; INTRAVENOUS; SUBCUTANEOUS
Refills: 0 | Status: DISCONTINUED | OUTPATIENT
Start: 2021-03-27 | End: 2021-03-27

## 2021-03-27 RX ORDER — KETOROLAC TROMETHAMINE 30 MG/ML
30 SYRINGE (ML) INJECTION EVERY 6 HOURS
Refills: 0 | Status: DISCONTINUED | OUTPATIENT
Start: 2021-03-27 | End: 2021-03-28

## 2021-03-27 RX ORDER — TRAMADOL HYDROCHLORIDE 50 MG/1
50 TABLET ORAL EVERY 6 HOURS
Refills: 0 | Status: DISCONTINUED | OUTPATIENT
Start: 2021-03-27 | End: 2021-03-28

## 2021-03-27 RX ADMIN — SODIUM CHLORIDE 125 MILLILITER(S): 9 INJECTION INTRAMUSCULAR; INTRAVENOUS; SUBCUTANEOUS at 00:09

## 2021-03-27 RX ADMIN — Medication 30 MILLIGRAM(S): at 23:49

## 2021-03-27 RX ADMIN — OXYCODONE HYDROCHLORIDE 10 MILLIGRAM(S): 5 TABLET ORAL at 22:33

## 2021-03-27 RX ADMIN — SENNA PLUS 2 TABLET(S): 8.6 TABLET ORAL at 21:33

## 2021-03-27 RX ADMIN — Medication 1 APPLICATION(S): at 04:58

## 2021-03-27 RX ADMIN — CHLORHEXIDINE GLUCONATE 1 APPLICATION(S): 213 SOLUTION TOPICAL at 04:58

## 2021-03-27 RX ADMIN — OXYCODONE HYDROCHLORIDE 10 MILLIGRAM(S): 5 TABLET ORAL at 14:11

## 2021-03-27 RX ADMIN — OXYCODONE HYDROCHLORIDE 10 MILLIGRAM(S): 5 TABLET ORAL at 21:33

## 2021-03-27 RX ADMIN — OXYCODONE HYDROCHLORIDE 5 MILLIGRAM(S): 5 TABLET ORAL at 03:35

## 2021-03-27 RX ADMIN — TRAMADOL HYDROCHLORIDE 50 MILLIGRAM(S): 50 TABLET ORAL at 23:49

## 2021-03-27 RX ADMIN — Medication 30 MILLIGRAM(S): at 17:16

## 2021-03-27 RX ADMIN — OXYCODONE HYDROCHLORIDE 5 MILLIGRAM(S): 5 TABLET ORAL at 04:20

## 2021-03-27 RX ADMIN — TRAMADOL HYDROCHLORIDE 50 MILLIGRAM(S): 50 TABLET ORAL at 17:16

## 2021-03-27 RX ADMIN — OXYCODONE HYDROCHLORIDE 10 MILLIGRAM(S): 5 TABLET ORAL at 13:41

## 2021-03-27 NOTE — PROGRESS NOTE ADULT - ASSESSMENT
29 y/o male with no significant pmhx who presents to the ER with RLE pain for 2-3 days after a mechanical fall, found in the ER to have R patella lateral dislocation and distal femoral condyle fx. s/p ORIF of left distal femur on 3/27 31 y/o male with no significant pmhx who presents to the ER with RLE pain for 2-3 days after a mechanical fall, found in the ER to have R patella lateral dislocation and distal femoral condyle fx. s/p ORIF of right distal femur on 3/27

## 2021-03-27 NOTE — PROGRESS NOTE ADULT - PROBLEM SELECTOR PLAN 1
2/2 mechanical fall. Admitted to ortho service for surgical intervention  -Pt was able to perform all ADLs prior to fall. RCRI of 0, class 1 risk  -EKG normal sinus rhythm, vitals stable. No further cardiac workup prior to surgery  -s/p ORIF of left distal femur fracture on 3/27  -postop management per ortho, pain control, IVF, IS, DVT ppx, PT/OT 2/2 mechanical fall. Admitted to ortho service for surgical intervention  -Pt was able to perform all ADLs prior to fall. RCRI of 0, class 1 risk  -EKG normal sinus rhythm, vitals stable. No further cardiac workup prior to surgery  -leg doppler (3/26) neg for DVT  -s/p ORIF of right distal femur fracture on 3/27  -postop management per ortho, pain control, IVF, IS, DVT ppx, PT/OT  -check vit D level in AM

## 2021-03-27 NOTE — BRIEF OPERATIVE NOTE - NSICDXBRIEFPROCEDURE_GEN_ALL_CORE_FT
PROCEDURES:  Open reduction and internal fixation (ORIF) of fracture of left distal femur 27-Mar-2021 14:18:01  Taylor Mazariegos

## 2021-03-27 NOTE — PROGRESS NOTE ADULT - SUBJECTIVE AND OBJECTIVE BOX
Ortho Progress Note    S: Patient seen and examined. No acute events overnight. Pain well controlled with current regimen. Denies lightheadedness/dizziness, CP/SOB. Tolerating diet. NPO for OR today.      O:  Physical Exam:  Gen: Laying in bed, NAD, alert and oriented.   Resp: Unlabored breathing  RLE : EHL/FHL/TA/Sol intact          + SILT DP/SP/MCCRACKEN/Sa/Tib          +DP, extremity WWP    Vital Signs Last 24 Hrs  T(C): 36.7 (27 Mar 2021 04:48), Max: 36.9 (26 Mar 2021 06:32)  T(F): 98.1 (27 Mar 2021 04:48), Max: 98.5 (26 Mar 2021 13:53)  HR: 88 (27 Mar 2021 04:48) (85 - 100)  BP: 105/62 (27 Mar 2021 04:48) (105/62 - 131/74)  BP(mean): --  RR: 18 (27 Mar 2021 04:48) (16 - 18)  SpO2: 100% (27 Mar 2021 04:48) (100% - 100%)                          11.1   6.33  )-----------( 212      ( 26 Mar 2021 07:16 )             34.3                         10.1   7.32  )-----------( 209      ( 25 Mar 2021 23:37 )             31.6       03-26    137  |  103  |  15  ----------------------------<  76  4.0   |  24  |  0.79        PT/INR - ( 26 Mar 2021 07:16 )   PT: 16.6 sec;   INR: 1.47 ratio         PTT - ( 26 Mar 2021 07:16 )  PTT:30.2 sec      A/P: 30M with R distal femur fracture, going to OR today  - Pain control  - Hold chemical DVT ppx  - NPO/IVF  - Besrest  - KI  - Dispo" OR today

## 2021-03-27 NOTE — PROGRESS NOTE ADULT - SUBJECTIVE AND OBJECTIVE BOX
ORTHO ATTENDING POST OP    s/p ORIF R distal femur  Reyes dressing  knee immobilizer  NWB R  LE  elevation  Lovenox  -> DC on  BID  ANcef 1g x 24h  venodynes  CBC in RR and AM  OOB to chair  PT consult  f/u 2 weeks

## 2021-03-27 NOTE — PROGRESS NOTE ADULT - PROBLEM SELECTOR PLAN 2
Pt with unspecified anemia to 10 from baseline 14.7 two years ago. Pt denies any bleeding or bruising. Pt also with elevated INR to 1.76 with normal PTT, normal plt, normal albumin/bili. No hx of easy bleeding/bruising and no blood clots  -Trend CBC, transfuse prn  -check iron panel, b12/folate in AM Pt with unspecified anemia to 10 from baseline 14.7 two years ago. Pt denies any bleeding or bruising. Pt also with elevated INR to 1.76 with normal PTT, normal plt, normal albumin/bili. No hx of easy bleeding/bruising and no blood clots  -Trend CBC, transfuse prn  -check iron panel, b12/folate, TSH in AM

## 2021-03-27 NOTE — PROGRESS NOTE ADULT - SUBJECTIVE AND OBJECTIVE BOX
Dr. Miri Rob  Pager [phone]    PROGRESS NOTE:     Patient is a 30y old  Male who presents with a chief complaint of R Distal Femur Fx (27 Mar 2021 12:44)      SUBJECTIVE / OVERNIGHT EVENTS: pt denies chest pain or sob, seen postop in PACU, hemodynamically stable, pain controlled  ADDITIONAL REVIEW OF SYSTEMS: afebrile    MEDICATIONS  (STANDING):  influenza   Vaccine 0.5 milliLiter(s) IntraMuscular once  ketorolac   Injectable 30 milliGRAM(s) IV Push every 6 hours  pantoprazole    Tablet 40 milliGRAM(s) Oral before breakfast  polyethylene glycol 3350 17 Gram(s) Oral once  senna 2 Tablet(s) Oral at bedtime  traMADol 50 milliGRAM(s) Oral every 6 hours    MEDICATIONS  (PRN):  fentaNYL    Injectable 50 MICROGram(s) IV Push every 15 minutes PRN Severe Pain (7 - 10)  HYDROmorphone  Injectable 0.5 milliGRAM(s) IV Push every 10 minutes PRN Moderate Pain (4 - 6)  HYDROmorphone  Injectable 0.5 milliGRAM(s) IV Push every 6 hours PRN breakthrough pain  magnesium hydroxide Suspension 30 milliLiter(s) Oral daily PRN Constipation  ondansetron Injectable 4 milliGRAM(s) IV Push every 6 hours PRN Nausea and/or Vomiting  oxyCODONE    IR 5 milliGRAM(s) Oral every 4 hours PRN Moderate Pain (4 - 6)  oxyCODONE    IR 2.5 milliGRAM(s) Oral every 4 hours PRN Mild Pain (1 - 3)  oxyCODONE    IR 5 milliGRAM(s) Oral once PRN Moderate Pain (4 - 6)  oxyCODONE    IR 10 milliGRAM(s) Oral every 4 hours PRN Severe Pain (7 - 10)      CAPILLARY BLOOD GLUCOSE        I&O's Summary      PHYSICAL EXAM:  Vital Signs Last 24 Hrs  T(C): 36.4 (27 Mar 2021 14:00), Max: 36.8 (27 Mar 2021 09:02)  T(F): 97.5 (27 Mar 2021 14:00), Max: 98.3 (27 Mar 2021 09:02)  HR: 96 (27 Mar 2021 14:00) (85 - 112)  BP: 136/82 (27 Mar 2021 14:00) (105/62 - 136/82)  BP(mean): 94 (27 Mar 2021 14:00) (70 - 94)  RR: 20 (27 Mar 2021 14:00) (16 - 20)  SpO2: 98% (27 Mar 2021 14:00) (94% - 100%)  General: NAD, well developed  HEAD:  Atraumatic, Normocephalic  EYES: EOMI,  conjunctiva and sclera clear  NECK: Supple, No JVD  CHEST/LUNG: Clear to auscultation bilaterally; No wheeze  HEART: Regular rate and rhythm, tachycardic,; No murmurs, rubs, or gallops  ABDOMEN: Soft, Nontender, Nondistended; Bowel sounds present  EXTREMITIES: RLE in wound dressing  PSYCH: AAOx3  NEUROLOGY: non-focal, RLE ROM limited due to pain  SKIN: No rashes or lesions    LABS:                        10.1   x     )-----------( 230      ( 27 Mar 2021 14:17 )             32.3     03-27    138  |  105  |  14  ----------------------------<  88  4.0   |  26  |  0.64    Ca    8.8      27 Mar 2021 05:31    TPro  7.0  /  Alb  3.9  /  TBili  0.8  /  DBili  <0.2  /  AST  65<H>  /  ALT  33  /  AlkPhos  59  03-26    PT/INR - ( 27 Mar 2021 05:31 )   PT: 15.6 sec;   INR: 1.38 ratio         PTT - ( 27 Mar 2021 05:31 )  PTT:29.3 sec      Urinalysis Basic - ( 26 Mar 2021 02:47 )    Color: Yellow / Appearance: Clear / S.039 / pH: x  Gluc: x / Ketone: Small  / Bili: Negative / Urobili: <2 mg/dL   Blood: x / Protein: 100 mg/dL / Nitrite: Negative   Leuk Esterase: Small / RBC: 16 /HPF / WBC 12 /HPF   Sq Epi: x / Non Sq Epi: 2 /HPF / Bacteria: Occasional      RADIOLOGY & ADDITIONAL TESTS:  Results Reviewed:   Imaging Personally Reviewed:  < from: US Duplex Venous Lower Ext Complete, Bilateral (21 @ 08:51) >    IMPRESSION:  No evidence of deep venous thrombosis in either lower extremity.      Electrocardiogram Personally Reviewed:    COORDINATION OF CARE:  Care Discussed with Consultants/Other Providers [Y/N]:  Prior or Outpatient Records Reviewed [Y/N]:

## 2021-03-27 NOTE — PROGRESS NOTE ADULT - SUBJECTIVE AND OBJECTIVE BOX
Orthopaedic Surgery Postop Check    Subjective:   Patient seen and examined  No acute events overnight  Pain well controlled in post op period    Objective:        PE    NAD  LLE:   dressing C/D/I  motor intact GS/TA/EHL  SILT S/S/SP/DP  WWP                30y Male s/p L distal femur ORIF  - Pain control  - NWB LLE in BJKI  - PT/OT/OOB  - DVT ppx  - IS  - Dispo planning   Orthopaedic Surgery Postop Check    Subjective:   Patient seen and examined  No acute events overnight  Pain well controlled in post op period    Objective:        PE    NAD  LLE:   dressing C/D/I  motor intact GS/TA/EHL  SILT S/S/SP/DP  WWP    Vitals 24hrs  Vital Signs Last 24 Hrs  T(C): 36.8 (27 Mar 2021 21:34), Max: 36.8 (27 Mar 2021 09:02)  T(F): 98.3 (27 Mar 2021 21:34), Max: 98.3 (27 Mar 2021 09:02)  HR: 100 (27 Mar 2021 21:34) (83 - 112)  BP: 120/71 (27 Mar 2021 21:34) (105/62 - 137/78)  BP(mean): 94 (27 Mar 2021 14:00) (70 - 94)  RR: 18 (27 Mar 2021 21:34) (16 - 20)  SpO2: 100% (27 Mar 2021 21:34) (94% - 100%)      03-27-21 @ 07:01  -  03-28-21 @ 03:36  --------------------------------------------------------  IN: 0 mL / OUT: 1500 mL / NET: -1500 mL        Lab Results 24hrs:                        10.1   10.77 )-----------( 230      ( 27 Mar 2021 14:17 )             32.3     03-27    137  |  103  |  11  ----------------------------<  133<H>  4.2   |  23  |  0.67    Ca    8.9      27 Mar 2021 14:17    TPro  7.0  /  Alb  3.9  /  TBili  0.8  /  DBili  <0.2  /  AST  65<H>  /  ALT  3d3  /  AlkPhos  59  03-26                    30y Male s/p L distal femur ORIF  - Pain control  - NWB LLE in BJKI  - PT/OT/OOB  - DVT ppx  - IS  - Dispo planning

## 2021-03-28 ENCOUNTER — TRANSCRIPTION ENCOUNTER (OUTPATIENT)
Age: 31
End: 2021-03-28

## 2021-03-28 VITALS
OXYGEN SATURATION: 98 % | SYSTOLIC BLOOD PRESSURE: 108 MMHG | TEMPERATURE: 98 F | RESPIRATION RATE: 18 BRPM | HEART RATE: 104 BPM | DIASTOLIC BLOOD PRESSURE: 61 MMHG

## 2021-03-28 LAB
24R-OH-CALCIDIOL SERPL-MCNC: 11.8 NG/ML — LOW (ref 30–80)
ANION GAP SERPL CALC-SCNC: 10 MMOL/L — SIGNIFICANT CHANGE UP (ref 7–14)
BUN SERPL-MCNC: 11 MG/DL — SIGNIFICANT CHANGE UP (ref 7–23)
CALCIUM SERPL-MCNC: 8.8 MG/DL — SIGNIFICANT CHANGE UP (ref 8.4–10.5)
CHLORIDE SERPL-SCNC: 101 MMOL/L — SIGNIFICANT CHANGE UP (ref 98–107)
CO2 SERPL-SCNC: 25 MMOL/L — SIGNIFICANT CHANGE UP (ref 22–31)
CREAT SERPL-MCNC: 0.7 MG/DL — SIGNIFICANT CHANGE UP (ref 0.5–1.3)
FERRITIN SERPL-MCNC: 139 NG/ML — SIGNIFICANT CHANGE UP (ref 30–400)
FOLATE SERPL-MCNC: 7.3 NG/ML — SIGNIFICANT CHANGE UP (ref 3.1–17.5)
GLUCOSE SERPL-MCNC: 90 MG/DL — SIGNIFICANT CHANGE UP (ref 70–99)
HCT VFR BLD CALC: 27.7 % — LOW (ref 39–50)
HGB BLD-MCNC: 8.9 G/DL — LOW (ref 13–17)
IRON SATN MFR SERPL: 10 % — LOW (ref 14–50)
IRON SATN MFR SERPL: 21 UG/DL — LOW (ref 45–165)
MCHC RBC-ENTMCNC: 26.3 PG — LOW (ref 27–34)
MCHC RBC-ENTMCNC: 32.1 GM/DL — SIGNIFICANT CHANGE UP (ref 32–36)
MCV RBC AUTO: 82 FL — SIGNIFICANT CHANGE UP (ref 80–100)
NRBC # BLD: 0 /100 WBCS — SIGNIFICANT CHANGE UP
NRBC # FLD: 0 K/UL — SIGNIFICANT CHANGE UP
PLATELET # BLD AUTO: 209 K/UL — SIGNIFICANT CHANGE UP (ref 150–400)
POTASSIUM SERPL-MCNC: 4 MMOL/L — SIGNIFICANT CHANGE UP (ref 3.5–5.3)
POTASSIUM SERPL-SCNC: 4 MMOL/L — SIGNIFICANT CHANGE UP (ref 3.5–5.3)
RBC # BLD: 3.38 M/UL — LOW (ref 4.2–5.8)
RBC # FLD: 13.4 % — SIGNIFICANT CHANGE UP (ref 10.3–14.5)
SODIUM SERPL-SCNC: 136 MMOL/L — SIGNIFICANT CHANGE UP (ref 135–145)
TIBC SERPL-MCNC: 219 UG/DL — LOW (ref 220–430)
TSH SERPL-MCNC: 0.49 UIU/ML — SIGNIFICANT CHANGE UP (ref 0.27–4.2)
UIBC SERPL-MCNC: 198 UG/DL — SIGNIFICANT CHANGE UP (ref 110–370)
VIT B12 SERPL-MCNC: 341 PG/ML — SIGNIFICANT CHANGE UP (ref 200–900)
WBC # BLD: 8.17 K/UL — SIGNIFICANT CHANGE UP (ref 3.8–10.5)
WBC # FLD AUTO: 8.17 K/UL — SIGNIFICANT CHANGE UP (ref 3.8–10.5)

## 2021-03-28 PROCEDURE — 99232 SBSQ HOSP IP/OBS MODERATE 35: CPT

## 2021-03-28 RX ORDER — OXYCODONE HYDROCHLORIDE 5 MG/1
1 TABLET ORAL
Qty: 20 | Refills: 0
Start: 2021-03-28 | End: 2021-04-01

## 2021-03-28 RX ORDER — ASPIRIN/CALCIUM CARB/MAGNESIUM 324 MG
1 TABLET ORAL
Qty: 60 | Refills: 0
Start: 2021-03-28 | End: 2021-04-26

## 2021-03-28 RX ADMIN — Medication 30 MILLIGRAM(S): at 13:33

## 2021-03-28 RX ADMIN — Medication 30 MILLIGRAM(S): at 05:59

## 2021-03-28 RX ADMIN — OXYCODONE HYDROCHLORIDE 10 MILLIGRAM(S): 5 TABLET ORAL at 05:58

## 2021-03-28 RX ADMIN — OXYCODONE HYDROCHLORIDE 10 MILLIGRAM(S): 5 TABLET ORAL at 14:20

## 2021-03-28 RX ADMIN — ENOXAPARIN SODIUM 40 MILLIGRAM(S): 100 INJECTION SUBCUTANEOUS at 13:33

## 2021-03-28 RX ADMIN — OXYCODONE HYDROCHLORIDE 10 MILLIGRAM(S): 5 TABLET ORAL at 13:33

## 2021-03-28 RX ADMIN — OXYCODONE HYDROCHLORIDE 10 MILLIGRAM(S): 5 TABLET ORAL at 06:58

## 2021-03-28 RX ADMIN — PANTOPRAZOLE SODIUM 40 MILLIGRAM(S): 20 TABLET, DELAYED RELEASE ORAL at 05:59

## 2021-03-28 NOTE — DISCHARGE NOTE PROVIDER - NSDCCPCAREPLAN_GEN_ALL_CORE_FT
PRINCIPAL DISCHARGE DIAGNOSIS  Diagnosis: Other closed fracture of right patella, initial encounter  Assessment and Plan of Treatment:

## 2021-03-28 NOTE — PROGRESS NOTE ADULT - ASSESSMENT
31 y/o male with no significant pmhx who presents to the ER with RLE pain for 2-3 days after a mechanical fall, found in the ER to have R patella lateral dislocation and distal femoral condyle fx. s/p ORIF of right distal femur on 3/27

## 2021-03-28 NOTE — PROGRESS NOTE ADULT - PROBLEM SELECTOR PLAN 2
Pt with unspecified anemia to 10 from baseline 14.7 two years ago. Pt denies any bleeding or bruising. Pt also with elevated INR to 1.76 with normal PTT, normal plt, normal albumin/bili. No hx of easy bleeding/bruising and no blood clots  -INR down to 1.38  -Trend CBC, transfuse prn, hgb downtrending from 10.1 to 8.9, asymptomatic  -ferritin 139, TSAT 10%, folate 7.3, b12 341, TSH 0.49

## 2021-03-28 NOTE — PROGRESS NOTE ADULT - SUBJECTIVE AND OBJECTIVE BOX
Dr. Miri Rob  Pager [phone]    PROGRESS NOTE:     Patient is a 30y old  Male who presents with a chief complaint of R Distal Femur Fx (28 Mar 2021 08:39)      SUBJECTIVE / OVERNIGHT EVENTS: pt feels well, pain controlled  ADDITIONAL REVIEW OF SYSTEMS: no chest pain/sob, afebrile     MEDICATIONS  (STANDING):  enoxaparin Injectable 40 milliGRAM(s) SubCutaneous daily  influenza   Vaccine 0.5 milliLiter(s) IntraMuscular once  ketorolac   Injectable 30 milliGRAM(s) IV Push every 6 hours  pantoprazole    Tablet 40 milliGRAM(s) Oral before breakfast  polyethylene glycol 3350 17 Gram(s) Oral once  senna 2 Tablet(s) Oral at bedtime  traMADol 50 milliGRAM(s) Oral every 6 hours    MEDICATIONS  (PRN):  bisacodyl Suppository 10 milliGRAM(s) Rectal daily PRN If no bowel movement by POD#2  HYDROmorphone  Injectable 0.5 milliGRAM(s) IV Push every 6 hours PRN breakthrough pain  magnesium hydroxide Suspension 30 milliLiter(s) Oral daily PRN Constipation  ondansetron Injectable 4 milliGRAM(s) IV Push every 6 hours PRN Nausea and/or Vomiting  oxyCODONE    IR 5 milliGRAM(s) Oral every 4 hours PRN Moderate Pain (4 - 6)  oxyCODONE    IR 2.5 milliGRAM(s) Oral every 4 hours PRN Mild Pain (1 - 3)  oxyCODONE    IR 10 milliGRAM(s) Oral every 4 hours PRN Severe Pain (7 - 10)      CAPILLARY BLOOD GLUCOSE        I&O's Summary    27 Mar 2021 07:01  -  28 Mar 2021 07:00  --------------------------------------------------------  IN: 0 mL / OUT: 2200 mL / NET: -2200 mL        PHYSICAL EXAM:  Vital Signs Last 24 Hrs  T(C): 36.8 (28 Mar 2021 08:58), Max: 36.8 (27 Mar 2021 17:21)  T(F): 98.3 (28 Mar 2021 08:58), Max: 98.3 (27 Mar 2021 17:21)  HR: 100 (28 Mar 2021 08:58) (83 - 112)  BP: 128/71 (28 Mar 2021 08:58) (117/64 - 137/78)  BP(mean): 94 (27 Mar 2021 14:00) (70 - 94)  RR: 18 (28 Mar 2021 08:58) (17 - 20)  SpO2: 100% (28 Mar 2021 08:58) (94% - 100%)  General: NAD, well developed  HEAD:  Atraumatic, Normocephalic  EYES: EOMI,  conjunctiva and sclera clear  NECK: Supple, No JVD  CHEST/LUNG: Clear to auscultation bilaterally; No wheeze  HEART: Regular rate and rhythm, tachycardic,; No murmurs, rubs, or gallops  ABDOMEN: Soft, Nontender, Nondistended; Bowel sounds present  EXTREMITIES: RLE in wound dressing  PSYCH: AAOx3  NEUROLOGY: non-focal, RLE ROM limited due to pain  SKIN: No rashes or lesions    LABS:                        8.9    8.17  )-----------( 209      ( 28 Mar 2021 09:31 )             27.7     03-28    136  |  101  |  11  ----------------------------<  90  4.0   |  25  |  0.70    Ca    8.8      28 Mar 2021 09:31      PT/INR - ( 27 Mar 2021 05:31 )   PT: 15.6 sec;   INR: 1.38 ratio         PTT - ( 27 Mar 2021 05:31 )  PTT:29.3 sec    RADIOLOGY & ADDITIONAL TESTS:  Results Reviewed:   Imaging Personally Reviewed:    Electrocardiogram Personally Reviewed:    COORDINATION OF CARE:  Care Discussed with Consultants/Other Providers [Y/N]:  Prior or Outpatient Records Reviewed [Y/N]:

## 2021-03-28 NOTE — PROGRESS NOTE ADULT - SUBJECTIVE AND OBJECTIVE BOX
Orthopaedic Surgery Progress Note     Subjective:   Patient seen and examined  No acute events overnight  Pain well controlled    Objective:        PE  Vitals 24hrs  Vital Signs Last 24 Hrs  T(C): 36.8 (27 Mar 2021 21:34), Max: 36.8 (27 Mar 2021 09:02)  T(F): 98.3 (27 Mar 2021 21:34), Max: 98.3 (27 Mar 2021 09:02)  HR: 100 (27 Mar 2021 21:34) (83 - 112)  BP: 120/71 (27 Mar 2021 21:34) (105/62 - 137/78)  BP(mean): 94 (27 Mar 2021 14:00) (70 - 94)  RR: 18 (27 Mar 2021 21:34) (16 - 20)  SpO2: 100% (27 Mar 2021 21:34) (94% - 100%)      03-27-21 @ 07:01  -  03-28-21 @ 03:35  --------------------------------------------------------  IN: 0 mL / OUT: 1500 mL / NET: -1500 mL        Lab Results 24hrs:                        10.1   10.77 )-----------( 230      ( 27 Mar 2021 14:17 )             32.3     03-27    137  |  103  |  11  ----------------------------<  133<H>  4.2   |  23  |  0.67    Ca    8.9      27 Mar 2021 14:17    TPro  7.0  /  Alb  3.9  /  TBili  0.8  /  DBili  <0.2  /  AST  65<H>  /  ALT  33  /  AlkPhos  59  03-26            NAD  LLE:   dressing C/D/I  motor intact GS/TA/EHL  SILT S/S/SP/DP  WWP                30y Male s/p L distal femur ORIF  - Pain control  - NWB LLE in BJKI  - PT/OT/OOB  - DVT ppx  - IS  - Dispo planning: Home

## 2021-03-28 NOTE — DISCHARGE NOTE NURSING/CASE MANAGEMENT/SOCIAL WORK - NSDCPNINST_GEN_ALL_CORE
Call Dr. Alberts for a follow-up post-op appointment. Notify your doctor for temperature greater than 100.4, for redness/swelling/drainage from your surgical incision, or for pain not relieved by pain medication. Utilize teresa brace as instructed. Take over the counter stool softeners, which is a side effect of pain medication.

## 2021-03-28 NOTE — DISCHARGE NOTE PROVIDER - CARE PROVIDER_API CALL
Tim Alberts (MD)  Orthopaedic Surgery  [address]  [place] [address]  Phone: [phone]  Fax: [phone]  Follow Up Time: 1 week

## 2021-03-28 NOTE — DISCHARGE NOTE PROVIDER - NSDCMRMEDTOKEN_GEN_ALL_CORE_FT
Ecotrin 325 mg oral delayed release tablet: 1 tab(s) orally 2 times a day MDD:2  oxyCODONE 5 mg oral tablet: 1 tab(s) orally every 6 hours, As Needed -Moderate Pain (4 - 6) MDD:4

## 2021-03-28 NOTE — DISCHARGE NOTE PROVIDER - HOSPITAL COURSE
30yMale c/o R knee pain s/p mechanical fall 3 days ago.  Patient was in [facility] when it was raining and he slipped in the mud/fell down stairs.  Patient denies head hit or LOC.  Was unable to standup / ambulate after fall. Denies anything hitting knee/leg when he fell.  Was seen in the ED there and placed in a cylinder cast. Was told he had a patella dislocation and would need surgery.  Landed at [facility] and came straight to [facility] ED. Patient denies numbness or tingling in the RLE. Patient denies any other injuries. Patient underwent ORIF R femur and tolerated the procedure well. He was discharged to the PACU in stable condition and told to be NWB on his RLE in a knee immobilizer. He received a [place] knee brace locked in extension. He cleared PT on POD1, tolerated PO and voided, and can be discharged to home in stable condition. 30yMale c/o R knee pain s/p mechanical fall 3 days ago.  Patient was in [facility] when it was raining and he slipped in the mud/fell down stairs.  Patient denies head hit or LOC.  Was unable to standup / ambulate after fall. Denies anything hitting knee/leg when he fell.  Was seen in the ED there and placed in a cylinder cast. Was told he had a patella dislocation and would need surgery.  Landed at [facility] and came straight to [facility] ED. Patient denies numbness or tingling in the RLE. Patient denies any other injuries. Preoperatively, patient received bilateral lower extremity doppler studies to rule out DVT, and both legs were negative for DVTs. Patient underwent ORIF R femur on 3/27 and tolerated the procedure well. He was discharged to the PACU in stable condition and told to be NWB on his RLE in a knee immobilizer. He received a [place] knee brace locked in extension. He cleared PT on POD1, tolerated PO and voided, and can be discharged to home in stable condition.

## 2021-03-28 NOTE — PROGRESS NOTE ADULT - PROBLEM SELECTOR PLAN 1
2/2 mechanical fall. Admitted to ortho service for surgical intervention  -leg doppler (3/26) neg for DVT  -s/p ORIF of right distal femur fracture on 3/27  -postop management per ortho, pain control, IVF, IS, DVT ppx, PT/OT  -check vit D level   -dispo planning home today per primary team

## 2021-03-28 NOTE — PROGRESS NOTE ADULT - REASON FOR ADMISSION
R Distal Femur Fx

## 2021-03-28 NOTE — PROGRESS NOTE ADULT - PROBLEM SELECTOR PROBLEM 1
Other closed fracture of right patella, initial encounter
Other closed fracture of right patella, initial encounter

## 2021-03-28 NOTE — DISCHARGE NOTE NURSING/CASE MANAGEMENT/SOCIAL WORK - PATIENT PORTAL LINK FT
You can access the FollowMyHealth Patient Portal offered by [facility] by registering at the following website: [email or web address]. By joining [employer]’s FollowMyHealth portal, you will also be able to view your health information using other applications (apps) compatible with our system.

## 2021-03-28 NOTE — DISCHARGE NOTE PROVIDER - NSDCCPTREATMENT_GEN_ALL_CORE_FT
PRINCIPAL PROCEDURE  Procedure: Open reduction and internal fixation (ORIF) of fracture of left distal femur  Findings and Treatment:       SECONDARY PROCEDURE  Procedure: Open reduction and internal fixation (ORIF) of fracture of left distal femur  Findings and Treatment:

## 2021-03-28 NOTE — DISCHARGE NOTE PROVIDER - NSDCFUADDINST_GEN_ALL_CORE_FT
RANDEE STODDARD in [place] locked in extension.    Pain medication sent to pharmacy.    Please take Aspirin twice per day for DVT prophylaxis.

## 2021-03-29 NOTE — ED POST DISCHARGE NOTE - RESULT SUMMARY
JUDE Martin: [place] discrepancy: R knee Status post casting of the right lower extremity limiting evaluation.  Lateralization the patella. Suspected avulsion fracture at the dorsal patella. Medial subluxation of the distal femur relative to the knee. Soft tissue swelling. CT was performed in the ED and patient was admitted to orthopedics for operative intervention.

## 2021-04-01 PROBLEM — Z00.00 ENCOUNTER FOR PREVENTIVE HEALTH EXAMINATION: Status: ACTIVE | Noted: 2021-04-01

## 2021-04-01 LAB — SURGICAL PATHOLOGY STUDY: SIGNIFICANT CHANGE UP

## 2021-04-03 ENCOUNTER — EMERGENCY (EMERGENCY)
Facility: HOSPITAL | Age: 31
LOS: 0 days | Discharge: HOME | End: 2021-04-03
Attending: EMERGENCY MEDICINE
Payer: MEDICAID

## 2021-04-03 VITALS
RESPIRATION RATE: 18 BRPM | HEART RATE: 89 BPM | SYSTOLIC BLOOD PRESSURE: 107 MMHG | OXYGEN SATURATION: 99 % | DIASTOLIC BLOOD PRESSURE: 70 MMHG | TEMPERATURE: 98 F

## 2021-04-03 VITALS
WEIGHT: 119.93 LBS | HEART RATE: 97 BPM | HEIGHT: 70 IN | RESPIRATION RATE: 16 BRPM | DIASTOLIC BLOOD PRESSURE: 83 MMHG | TEMPERATURE: 99 F | SYSTOLIC BLOOD PRESSURE: 128 MMHG | OXYGEN SATURATION: 98 %

## 2021-04-03 DIAGNOSIS — G89.18 OTHER ACUTE POSTPROCEDURAL PAIN: ICD-10-CM

## 2021-04-03 DIAGNOSIS — Z98.890 OTHER SPECIFIED POSTPROCEDURAL STATES: ICD-10-CM

## 2021-04-03 DIAGNOSIS — M25.561 PAIN IN RIGHT KNEE: ICD-10-CM

## 2021-04-03 DIAGNOSIS — Z79.82 LONG TERM (CURRENT) USE OF ASPIRIN: ICD-10-CM

## 2021-04-03 PROCEDURE — 73562 X-RAY EXAM OF KNEE 3: CPT | Mod: 26,RT

## 2021-04-03 PROCEDURE — 73552 X-RAY EXAM OF FEMUR 2/>: CPT | Mod: 26,RT

## 2021-04-03 PROCEDURE — 99284 EMERGENCY DEPT VISIT MOD MDM: CPT

## 2021-04-03 RX ORDER — OXYCODONE AND ACETAMINOPHEN 5; 325 MG/1; MG/1
1 TABLET ORAL ONCE
Refills: 0 | Status: DISCONTINUED | OUTPATIENT
Start: 2021-04-03 | End: 2021-04-03

## 2021-04-03 RX ORDER — OXYCODONE HYDROCHLORIDE 5 MG/1
1 TABLET ORAL
Qty: 20 | Refills: 0
Start: 2021-04-03 | End: 2021-04-07

## 2021-04-03 RX ADMIN — OXYCODONE AND ACETAMINOPHEN 1 TABLET(S): 5; 325 TABLET ORAL at 06:34

## 2021-04-03 RX ADMIN — OXYCODONE AND ACETAMINOPHEN 1 TABLET(S): 5; 325 TABLET ORAL at 04:34

## 2021-04-03 RX ADMIN — OXYCODONE AND ACETAMINOPHEN 1 TABLET(S): 5; 325 TABLET ORAL at 05:57

## 2021-04-03 RX ADMIN — OXYCODONE AND ACETAMINOPHEN 1 TABLET(S): 5; 325 TABLET ORAL at 05:39

## 2021-04-03 NOTE — ED PROVIDER NOTE - PATIENT PORTAL LINK FT
You can access the FollowMyHealth Patient Portal offered by [facility] by registering at the following website: [email or web address]. By joining [facility] FollowMyHealth portal, you will also be able to view your health information using other applications (apps) compatible with our system.

## 2021-04-03 NOTE — ED PROVIDER NOTE - NSDCPRINTRESULTS_ED_ALL_ED
Patient requests all Lab and Radiology Results on their Discharge Instructions Dressing: pressure dressing with telfa

## 2021-04-03 NOTE — ED PROVIDER NOTE - CLINICAL SUMMARY MEDICAL DECISION MAKING FREE TEXT BOX
d/w ortho resident Marek, who reviewed xray, states no removal of brace, recommend few more days percocet and fu with ortho.

## 2021-04-03 NOTE — ED PROVIDER NOTE - OBJECTIVE STATEMENT
29yo male with pmh persistent rt knee pain, currently POD #6 ORIF for distal femur fx. Currently in [place] knee brace locked in extension. Pt also ran out of percocet. denies further trauma    No fever/chills, No photophobia/eye pain/changes in vision, No ear pain/sore throat/dysphagia, No chest pain/palpitations, no SOB/cough, no wheeze/stridor, No abdominal pain, No N/V/D, no dysuria/frequency/discharge, No neck/back pain, + Rt knee pain. no rash, no changes in neurological status/function.

## 2021-04-03 NOTE — ED PROVIDER NOTE - NSFOLLOWUPINSTRUCTIONS_ED_ALL_ED_FT
Please follow up with your orthopedist per your appt.  Return to the Emergency Department for worsening severe knee pain, numbness or weakness or your leg or any other concern.  Rest, drink plenty of fluids.  Advance activity as tolerated.  Continue all previously prescribed medications as directed.

## 2021-04-03 NOTE — ED PROVIDER NOTE - PHYSICAL EXAMINATION
Gen: Alert, Well appearing. NAD    Head: NC, AT, PERRL, normal lids/conjunctiva   Pulm: Bilateral clear BS, normal resp effort  CV: RRR, no M/R/G, +dist pulses   Abd: soft, NT/ND, +BS, no guarding/rebound tenderness  Mskel: Rt LEg in knee brace with ace bandage. + DP/PT pulses and moving toes, sensation intact  Skin: no rash, no bruising  Neuro: AAOx3, no sensory/motor deficits Gen: Alert, Well appearing. NAD  , no significant pain  Head: NC, AT, PERRL, normal lids/conjunctiva   Pulm: Bilateral clear BS, normal resp effort  CV: RRR, no M/R/G, +dist pulses   Abd: soft, NT/ND, +BS, no guarding/rebound tenderness  Mskel: Rt LEg in knee brace with ace bandage. no significant swelling. warm well perfused foot. + DP/PT pulses and moving toes, sensation intact  Skin: no rash, no bruising  Neuro: AAOx3, no sensory/motor deficits

## 2021-04-03 NOTE — ED ADULT TRIAGE NOTE - CHIEF COMPLAINT QUOTE
Pt states he slipped down the stairs and broke his right knee, had surgery on knee on 03/26/2021. States he ran out of meds and has pain to knee

## 2021-04-03 NOTE — ED ADULT NURSE NOTE - OBJECTIVE STATEMENT
Pt presents to the ED complaining of 7/10 right knee pain. Pt states that he fell down the stairs and had surgery this past Saturday on the knee. No medical hx. No allergies./ Denies any smoking/drinking. Denies chest pain, SOB, difficulty breathing.

## 2021-04-14 ENCOUNTER — APPOINTMENT (OUTPATIENT)
Dept: ORTHOPEDIC SURGERY | Facility: CLINIC | Age: 31
End: 2021-04-14
Payer: MEDICAID

## 2021-04-14 PROCEDURE — 99024 POSTOP FOLLOW-UP VISIT: CPT

## 2021-04-14 RX ORDER — IBUPROFEN 800 MG/1
800 TABLET, FILM COATED ORAL EVERY 6 HOURS
Qty: 120 | Refills: 1 | Status: ACTIVE | COMMUNITY
Start: 2021-04-14 | End: 1900-01-01

## 2021-04-28 ENCOUNTER — APPOINTMENT (OUTPATIENT)
Dept: ORTHOPEDIC SURGERY | Facility: CLINIC | Age: 31
End: 2021-04-28
Payer: MEDICAID

## 2021-04-28 PROCEDURE — 73562 X-RAY EXAM OF KNEE 3: CPT | Mod: RT

## 2021-04-28 PROCEDURE — 99024 POSTOP FOLLOW-UP VISIT: CPT

## 2021-06-09 ENCOUNTER — APPOINTMENT (OUTPATIENT)
Dept: ORTHOPEDIC SURGERY | Facility: CLINIC | Age: 31
End: 2021-06-09
Payer: MEDICAID

## 2021-06-09 PROCEDURE — 73562 X-RAY EXAM OF KNEE 3: CPT | Mod: RT

## 2021-06-09 PROCEDURE — 99024 POSTOP FOLLOW-UP VISIT: CPT

## 2021-08-16 ENCOUNTER — APPOINTMENT (OUTPATIENT)
Dept: ORTHOPEDIC SURGERY | Facility: CLINIC | Age: 31
End: 2021-08-16
Payer: MEDICAID

## 2021-08-16 VITALS — BODY MASS INDEX: 25.52 KG/M2 | WEIGHT: 130 LBS | HEIGHT: 60 IN

## 2021-08-16 DIAGNOSIS — S72.491D OTHER FRACTURE OF LOWER END OF RIGHT FEMUR, SUBSEQUENT ENCOUNTER FOR CLOSED FRACTURE WITH ROUTINE HEALING: ICD-10-CM

## 2021-08-16 PROCEDURE — 73562 X-RAY EXAM OF KNEE 3: CPT | Mod: RT

## 2021-08-16 PROCEDURE — 99213 OFFICE O/P EST LOW 20 MIN: CPT

## 2021-08-21 PROBLEM — S72.491D OTHER CLOSED FRACTURE OF DISTAL END OF RIGHT FEMUR WITH ROUTINE HEALING, SUBSEQUENT ENCOUNTER: Status: ACTIVE | Noted: 2021-04-14

## 2024-01-04 NOTE — DISCHARGE NOTE NURSING/CASE MANAGEMENT/SOCIAL WORK - NSTOBACCONEVERSMOKERY/N_GEN_A
Called patient to obtain new insurance information there was no answer and could not leave voice mail.  
No

## 2024-09-23 NOTE — ED ADULT TRIAGE NOTE - SOURCE OF INFORMATION
PROCEDURES:  Repair of ulnar collateral ligament of right thumb 23-Sep-2024 18:02:32  Mireya Carty   Patient